# Patient Record
Sex: MALE | Race: ASIAN | Employment: STUDENT | URBAN - METROPOLITAN AREA
[De-identification: names, ages, dates, MRNs, and addresses within clinical notes are randomized per-mention and may not be internally consistent; named-entity substitution may affect disease eponyms.]

---
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
## 2025-05-18 ENCOUNTER — OFFICE VISIT (OUTPATIENT)
Dept: FAMILY MEDICINE CLINIC | Facility: CLINIC | Age: 20
End: 2025-05-18
Payer: COMMERCIAL

## 2025-05-18 VITALS
DIASTOLIC BLOOD PRESSURE: 62 MMHG | SYSTOLIC BLOOD PRESSURE: 116 MMHG | HEART RATE: 62 BPM | HEIGHT: 72 IN | BODY MASS INDEX: 31.91 KG/M2 | RESPIRATION RATE: 21 BRPM | TEMPERATURE: 98 F | WEIGHT: 235.63 LBS | OXYGEN SATURATION: 99 %

## 2025-05-18 DIAGNOSIS — H00.025 HORDEOLUM INTERNUM OF LEFT LOWER EYELID: Primary | ICD-10-CM

## 2025-05-18 RX ORDER — ERYTHROMYCIN 5 MG/G
1 OINTMENT OPHTHALMIC 2 TIMES DAILY
Qty: 1 G | Refills: 0 | Status: SHIPPED | OUTPATIENT
Start: 2025-05-18 | End: 2025-05-23

## 2025-05-18 NOTE — PROGRESS NOTES
Subjective:   Patient ID: Geraldo Chun is a 19 year old male.    Eye Problem   The right eye is affected. This is a new problem. Episode onset: in the past three days. The problem occurs constantly. The problem has been gradually worsening. There was no injury mechanism. The patient is experiencing no pain. There is No known exposure to pink eye. He Does not wear contacts. Associated symptoms include itching. Associated symptoms comments: Redness and swelling to lower lid. He has tried eye drops (allergy medication yesterday) for the symptoms. The treatment provided mild relief.       History/Other:   Review of Systems   Eyes:  Positive for itching.   All other systems reviewed and are negative.    Current Medications[1]  Allergies:Allergies[2]    /62 (BP Location: Right arm, Patient Position: Sitting, Cuff Size: large)   Pulse 62   Temp 98.1 °F (36.7 °C) (Oral)   Resp 21   Ht 6' (1.829 m)   Wt 235 lb 9.6 oz (106.9 kg)   SpO2 99%   BMI 31.95 kg/m²       Objective:   Physical Exam  Constitutional:       General: He is not in acute distress.     Appearance: Normal appearance. He is not ill-appearing.   HENT:      Head: Normocephalic and atraumatic.      Ears:      Comments: Mildly full TMs bilaterally     Nose: Nose normal.      Mouth/Throat:      Mouth: Mucous membranes are moist.      Pharynx: Oropharynx is clear.   Eyes:      General:         Right eye: Hordeolum present. No discharge.      Extraocular Movements: Extraocular movements intact.      Conjunctiva/sclera:      Right eye: Right conjunctiva is injected.      Pupils: Pupils are equal, round, and reactive to light.        Comments: Right lower lid with mild redness and swelling from tear duct to center of eye. Slightly tender to touch. Internal hordeolum noted to inner lower lid   Cardiovascular:      Rate and Rhythm: Normal rate and regular rhythm.      Pulses: Normal pulses.   Pulmonary:      Effort: Pulmonary effort is normal. No  respiratory distress.      Breath sounds: Normal breath sounds.   Musculoskeletal:         General: Normal range of motion.      Cervical back: Normal range of motion and neck supple.   Lymphadenopathy:      Cervical: No cervical adenopathy.   Skin:     General: Skin is warm and dry.   Neurological:      General: No focal deficit present.      Mental Status: He is alert.   Psychiatric:         Mood and Affect: Mood normal.         Assessment & Plan:   1. Hordeolum internum of left lower eyelid        No orders of the defined types were placed in this encounter.      Meds This Visit:  Requested Prescriptions     Signed Prescriptions Disp Refills    erythromycin 5 MG/GM Ophthalmic Ointment 1 g 0     Sig: Place 1 Application into the right eye 2 (two) times daily for 5 days.     Patient Instructions   May use COLD covered ice pack to the lower lids for swelling. Place for 5 minutes 3-4 times daily.  Continue Zyrtec at night for one week.  Follow up with your eye doctor if symptoms persist or worsen.  Medication use and risk/benefit discussed. Self care discussed. Follow up with eye doctor if symptoms persist. Patient verbalized understanding and agrees to plan.         [1]   Current Outpatient Medications   Medication Sig Dispense Refill    erythromycin 5 MG/GM Ophthalmic Ointment Place 1 Application into the right eye 2 (two) times daily for 5 days. 1 g 0   [2] No Known Allergies

## 2025-05-18 NOTE — PATIENT INSTRUCTIONS
May use COLD covered ice pack to the lower lids for swelling. Place for 5 minutes 3-4 times daily.  Continue Zyrtec at night for one week.  Follow up with your eye doctor if symptoms persist or worsen.